# Patient Record
Sex: FEMALE | Race: WHITE | NOT HISPANIC OR LATINO
[De-identification: names, ages, dates, MRNs, and addresses within clinical notes are randomized per-mention and may not be internally consistent; named-entity substitution may affect disease eponyms.]

---

## 2019-12-06 PROBLEM — Z00.00 ENCOUNTER FOR PREVENTIVE HEALTH EXAMINATION: Status: ACTIVE | Noted: 2019-12-06

## 2019-12-09 ENCOUNTER — APPOINTMENT (OUTPATIENT)
Dept: CARDIOLOGY | Facility: CLINIC | Age: 64
End: 2019-12-09
Payer: COMMERCIAL

## 2019-12-09 ENCOUNTER — NON-APPOINTMENT (OUTPATIENT)
Age: 64
End: 2019-12-09

## 2019-12-09 VITALS
SYSTOLIC BLOOD PRESSURE: 110 MMHG | HEART RATE: 64 BPM | WEIGHT: 127 LBS | OXYGEN SATURATION: 98 % | DIASTOLIC BLOOD PRESSURE: 60 MMHG | BODY MASS INDEX: 22.5 KG/M2 | HEIGHT: 63 IN

## 2019-12-09 VITALS — SYSTOLIC BLOOD PRESSURE: 116 MMHG | DIASTOLIC BLOOD PRESSURE: 60 MMHG

## 2019-12-09 DIAGNOSIS — Z78.9 OTHER SPECIFIED HEALTH STATUS: ICD-10-CM

## 2019-12-09 DIAGNOSIS — Z82.49 FAMILY HISTORY OF ISCHEMIC HEART DISEASE AND OTHER DISEASES OF THE CIRCULATORY SYSTEM: ICD-10-CM

## 2019-12-09 PROCEDURE — 99204 OFFICE O/P NEW MOD 45 MIN: CPT

## 2019-12-09 PROCEDURE — 93000 ELECTROCARDIOGRAM COMPLETE: CPT

## 2019-12-09 NOTE — HISTORY OF PRESENT ILLNESS
[FreeTextEntry1] : The patient presented here at [facility] for cardiac evaluation. Patient is 64 and ALT female without any significant medical history. She does not take any medications. Patient is physically active. She exercises on a daily basis. She denies any chest pains or shortness of breath.

## 2019-12-09 NOTE — ASSESSMENT
[FreeTextEntry1] : That his ECG was reviewed. Normal sinus rhythm, normal EKG. Patient is asymptomatic. Patient is physically very active. Her blood work was reviewed. Her cholesterol is within normal limits. Continue, prevention. Continue exercise and diet. Cardiac followup yearly and as needed.

## 2019-12-09 NOTE — PHYSICAL EXAM
[Well Groomed] : well groomed [General Appearance - Well Developed] : well developed [Normal Appearance] : normal appearance [No Deformities] : no deformities [General Appearance - Well Nourished] : well nourished [General Appearance - In No Acute Distress] : no acute distress [Normal Conjunctiva] : the conjunctiva exhibited no abnormalities [Eyelids - No Xanthelasma] : the eyelids demonstrated no xanthelasmas [Normal Oral Mucosa] : normal oral mucosa [No Oral Pallor] : no oral pallor [No Oral Cyanosis] : no oral cyanosis [Normal Jugular Venous A Waves Present] : normal jugular venous A waves present [Normal Jugular Venous V Waves Present] : normal jugular venous V waves present [Heart Rate And Rhythm] : heart rate and rhythm were normal [No Jugular Venous Mendiola A Waves] : no jugular venous mendiola A waves [Heart Sounds] : normal S1 and S2 [Murmurs] : no murmurs present [Respiration, Rhythm And Depth] : normal respiratory rhythm and effort [Exaggerated Use Of Accessory Muscles For Inspiration] : no accessory muscle use [Auscultation Breath Sounds / Voice Sounds] : lungs were clear to auscultation bilaterally [Abdomen Soft] : soft [Abdomen Tenderness] : non-tender [] : no hepato-splenomegaly [Abdomen Mass (___ Cm)] : no abdominal mass palpated [Abnormal Walk] : normal gait [Gait - Sufficient For Exercise Testing] : the gait was sufficient for exercise testing

## 2021-11-17 ENCOUNTER — APPOINTMENT (OUTPATIENT)
Dept: OPHTHALMOLOGY | Facility: CLINIC | Age: 66
End: 2021-11-17
Payer: MEDICARE

## 2021-11-17 ENCOUNTER — NON-APPOINTMENT (OUTPATIENT)
Age: 66
End: 2021-11-17

## 2021-11-17 PROCEDURE — 99213 OFFICE O/P EST LOW 20 MIN: CPT

## 2021-11-17 PROCEDURE — 92020 GONIOSCOPY: CPT

## 2022-03-10 ENCOUNTER — APPOINTMENT (OUTPATIENT)
Dept: CARDIOLOGY | Facility: CLINIC | Age: 67
End: 2022-03-10
Payer: MEDICARE

## 2022-03-10 VITALS
HEIGHT: 63 IN | BODY MASS INDEX: 22.15 KG/M2 | DIASTOLIC BLOOD PRESSURE: 66 MMHG | OXYGEN SATURATION: 98 % | WEIGHT: 125 LBS | TEMPERATURE: 98.2 F | HEART RATE: 55 BPM | SYSTOLIC BLOOD PRESSURE: 104 MMHG

## 2022-03-10 PROCEDURE — 99214 OFFICE O/P EST MOD 30 MIN: CPT

## 2022-03-10 NOTE — HISTORY OF PRESENT ILLNESS
[FreeTextEntry1] : The patient presented here at [facility] for cardiac evaluation. Patient is 66 and ALT female without any significant medical history. She does not take any medications. Patient is physically active. She exercises on a daily basis. She denies any chest pains or shortness of breath.

## 2022-03-10 NOTE — ASSESSMENT
[FreeTextEntry1] : That his ECG was reviewed. Normal sinus rhythm, normal EKG. Patient is asymptomatic. Patient is physically very active. Her blood work was reviewed. Her cholesterol is within normal limits. Continue primary prevention. Continue exercise and diet.\par \par She reports mild dyspnea on exertion.  We will follow order echocardiogram for this year to reevaluate ejection fraction and rule out any evidence of pericardial effusion.  Follow-up 6 months after echocardiogram.  And follow-up as needed.

## 2022-03-10 NOTE — PHYSICAL EXAM
[General Appearance - Well Developed] : well developed [Normal Appearance] : normal appearance [Well Groomed] : well groomed [General Appearance - Well Nourished] : well nourished [General Appearance - In No Acute Distress] : no acute distress [No Deformities] : no deformities [Normal Conjunctiva] : the conjunctiva exhibited no abnormalities [Eyelids - No Xanthelasma] : the eyelids demonstrated no xanthelasmas [Normal Oral Mucosa] : normal oral mucosa [No Oral Pallor] : no oral pallor [No Oral Cyanosis] : no oral cyanosis [Normal Jugular Venous A Waves Present] : normal jugular venous A waves present [Normal Jugular Venous V Waves Present] : normal jugular venous V waves present [No Jugular Venous Mendiola A Waves] : no jugular venous mendiola A waves [Heart Rate And Rhythm] : heart rate and rhythm were normal [Heart Sounds] : normal S1 and S2 [Murmurs] : no murmurs present [Respiration, Rhythm And Depth] : normal respiratory rhythm and effort [Exaggerated Use Of Accessory Muscles For Inspiration] : no accessory muscle use [Auscultation Breath Sounds / Voice Sounds] : lungs were clear to auscultation bilaterally [Abdomen Soft] : soft [Abdomen Tenderness] : non-tender [] : no hepato-splenomegaly [Abdomen Mass (___ Cm)] : no abdominal mass palpated [Abnormal Walk] : normal gait [Gait - Sufficient For Exercise Testing] : the gait was sufficient for exercise testing [Normal] : no edema, no cyanosis, no clubbing, no varicosities

## 2022-05-25 ENCOUNTER — APPOINTMENT (OUTPATIENT)
Dept: OPHTHALMOLOGY | Facility: CLINIC | Age: 67
End: 2022-05-25
Payer: MEDICARE

## 2022-05-25 ENCOUNTER — NON-APPOINTMENT (OUTPATIENT)
Age: 67
End: 2022-05-25

## 2022-05-25 PROCEDURE — 92020 GONIOSCOPY: CPT

## 2022-05-25 PROCEDURE — 92014 COMPRE OPH EXAM EST PT 1/>: CPT

## 2022-09-13 ENCOUNTER — APPOINTMENT (OUTPATIENT)
Dept: CARDIOLOGY | Facility: CLINIC | Age: 67
End: 2022-09-13

## 2022-09-13 VITALS
SYSTOLIC BLOOD PRESSURE: 122 MMHG | WEIGHT: 124 LBS | BODY MASS INDEX: 21.97 KG/M2 | HEIGHT: 63 IN | TEMPERATURE: 97.1 F | DIASTOLIC BLOOD PRESSURE: 68 MMHG | OXYGEN SATURATION: 99 % | HEART RATE: 45 BPM

## 2022-09-13 PROCEDURE — 99214 OFFICE O/P EST MOD 30 MIN: CPT

## 2022-09-13 PROCEDURE — 93306 TTE W/DOPPLER COMPLETE: CPT

## 2022-09-13 NOTE — HISTORY OF PRESENT ILLNESS
[FreeTextEntry1] : The patient presented here at [facility] for cardiac evaluation. Patient is 67  female without any significant medical history. She does not take any medications. Patient is physically active. She exercises on a daily basis. She denies any chest pains or shortness of breath.

## 2022-09-13 NOTE — PHYSICAL EXAM
[Normal] : no edema, no cyanosis, no clubbing, no varicosities [General Appearance - Well Developed] : well developed [Normal Appearance] : normal appearance [Well Groomed] : well groomed [General Appearance - Well Nourished] : well nourished [No Deformities] : no deformities [General Appearance - In No Acute Distress] : no acute distress [Normal Conjunctiva] : the conjunctiva exhibited no abnormalities [Eyelids - No Xanthelasma] : the eyelids demonstrated no xanthelasmas [Normal Oral Mucosa] : normal oral mucosa [No Oral Pallor] : no oral pallor [No Oral Cyanosis] : no oral cyanosis [Normal Jugular Venous A Waves Present] : normal jugular venous A waves present [Normal Jugular Venous V Waves Present] : normal jugular venous V waves present [No Jugular Venous Mendiola A Waves] : no jugular venous mendiola A waves [Heart Rate And Rhythm] : heart rate and rhythm were normal [Heart Sounds] : normal S1 and S2 [Murmurs] : no murmurs present [Respiration, Rhythm And Depth] : normal respiratory rhythm and effort [Exaggerated Use Of Accessory Muscles For Inspiration] : no accessory muscle use [Auscultation Breath Sounds / Voice Sounds] : lungs were clear to auscultation bilaterally [Abdomen Soft] : soft [Abdomen Tenderness] : non-tender [] : no hepato-splenomegaly [Abdomen Mass (___ Cm)] : no abdominal mass palpated [Abnormal Walk] : normal gait [Gait - Sufficient For Exercise Testing] : the gait was sufficient for exercise testing

## 2022-09-13 NOTE — ASSESSMENT
[FreeTextEntry1] : That his ECG was reviewed. Normal sinus rhythm, normal EKG. Patient is asymptomatic. Patient is physically very active. Her blood work was reviewed. Her cholesterol is within normal limits. Continue primary prevention. Continue exercise and diet.\par \par Echocardiogram done today was reviewed.  Normal ejection fraction.  No significant valvulopathy.  Continue primary prevention.  Cardiac follow-up 6 months and as needed.

## 2022-11-30 ENCOUNTER — NON-APPOINTMENT (OUTPATIENT)
Age: 67
End: 2022-11-30

## 2022-11-30 ENCOUNTER — APPOINTMENT (OUTPATIENT)
Dept: OPHTHALMOLOGY | Facility: CLINIC | Age: 67
End: 2022-11-30

## 2022-11-30 PROCEDURE — 92020 GONIOSCOPY: CPT

## 2022-11-30 PROCEDURE — 99213 OFFICE O/P EST LOW 20 MIN: CPT

## 2022-11-30 PROCEDURE — 76514 ECHO EXAM OF EYE THICKNESS: CPT

## 2023-02-09 ENCOUNTER — NON-APPOINTMENT (OUTPATIENT)
Age: 68
End: 2023-02-09

## 2023-02-09 ENCOUNTER — APPOINTMENT (OUTPATIENT)
Dept: CARDIOLOGY | Facility: CLINIC | Age: 68
End: 2023-02-09
Payer: MEDICARE

## 2023-02-09 VITALS
OXYGEN SATURATION: 98 % | SYSTOLIC BLOOD PRESSURE: 124 MMHG | HEIGHT: 60 IN | HEART RATE: 49 BPM | TEMPERATURE: 97.7 F | DIASTOLIC BLOOD PRESSURE: 70 MMHG | WEIGHT: 131 LBS | BODY MASS INDEX: 25.72 KG/M2

## 2023-02-09 PROCEDURE — 93000 ELECTROCARDIOGRAM COMPLETE: CPT

## 2023-02-09 PROCEDURE — 99214 OFFICE O/P EST MOD 30 MIN: CPT

## 2023-02-09 NOTE — PHYSICAL EXAM
[Well Developed] : well developed [Well Nourished] : well nourished [No Acute Distress] : no acute distress [Normal Venous Pressure] : normal venous pressure [No Carotid Bruit] : no carotid bruit [Normal S1, S2] : normal S1, S2 [No Murmur] : no murmur [No Rub] : no rub [No Gallop] : no gallop [Clear Lung Fields] : clear lung fields [Good Air Entry] : good air entry [No Respiratory Distress] : no respiratory distress  [Soft] : abdomen soft [Non Tender] : non-tender [No Masses/organomegaly] : no masses/organomegaly [Normal Bowel Sounds] : normal bowel sounds [Normal Gait] : normal gait [Normal] : no edema, no cyanosis, no clubbing, no varicosities [No Edema] : no edema [No Cyanosis] : no cyanosis [No Clubbing] : no clubbing [No Varicosities] : no varicosities [No Rash] : no rash [No Skin Lesions] : no skin lesions [Moves all extremities] : moves all extremities [No Focal Deficits] : no focal deficits [Normal Speech] : normal speech [Alert and Oriented] : alert and oriented [Normal memory] : normal memory [General Appearance - Well Developed] : well developed [Normal Appearance] : normal appearance [Well Groomed] : well groomed [General Appearance - Well Nourished] : well nourished [No Deformities] : no deformities [General Appearance - In No Acute Distress] : no acute distress [Normal Conjunctiva] : the conjunctiva exhibited no abnormalities [Eyelids - No Xanthelasma] : the eyelids demonstrated no xanthelasmas [Normal Oral Mucosa] : normal oral mucosa [No Oral Pallor] : no oral pallor [No Oral Cyanosis] : no oral cyanosis [Normal Jugular Venous A Waves Present] : normal jugular venous A waves present [Normal Jugular Venous V Waves Present] : normal jugular venous V waves present [No Jugular Venous Mendiola A Waves] : no jugular venous mendiola A waves [Heart Rate And Rhythm] : heart rate and rhythm were normal [Heart Sounds] : normal S1 and S2 [Murmurs] : no murmurs present [Respiration, Rhythm And Depth] : normal respiratory rhythm and effort [Exaggerated Use Of Accessory Muscles For Inspiration] : no accessory muscle use [Auscultation Breath Sounds / Voice Sounds] : lungs were clear to auscultation bilaterally [Abdomen Soft] : soft [Abdomen Tenderness] : non-tender [] : no hepato-splenomegaly [Abdomen Mass (___ Cm)] : no abdominal mass palpated [Abnormal Walk] : normal gait [Gait - Sufficient For Exercise Testing] : the gait was sufficient for exercise testing

## 2023-02-09 NOTE — ASSESSMENT
[FreeTextEntry1] : That his ECG was reviewed.  Sinus bradycardia.  No history of syncope or near syncope.  Recent complaints of lightheadedness.  Increase hydration discussed with the patient.  Last echocardiogram was done in September 2022 results reviewed.  I recommend Zio patch for 1 week to rule out any significant bradycardia possibly causing the symptoms.  I do recommend carotid ultrasound to rule out any carotid disease.  Follow-up after testing.

## 2023-03-07 ENCOUNTER — APPOINTMENT (OUTPATIENT)
Dept: CARDIOLOGY | Facility: CLINIC | Age: 68
End: 2023-03-07
Payer: MEDICARE

## 2023-03-07 PROCEDURE — 93244 EXT ECG>48HR<7D REV&INTERPJ: CPT

## 2023-03-14 ENCOUNTER — APPOINTMENT (OUTPATIENT)
Dept: CARDIOLOGY | Facility: CLINIC | Age: 68
End: 2023-03-14
Payer: MEDICARE

## 2023-03-14 ENCOUNTER — APPOINTMENT (OUTPATIENT)
Dept: CARDIOLOGY | Facility: CLINIC | Age: 68
End: 2023-03-14

## 2023-03-14 VITALS
HEART RATE: 64 BPM | HEIGHT: 60 IN | DIASTOLIC BLOOD PRESSURE: 60 MMHG | BODY MASS INDEX: 24.54 KG/M2 | WEIGHT: 125 LBS | SYSTOLIC BLOOD PRESSURE: 102 MMHG | OXYGEN SATURATION: 98 % | TEMPERATURE: 97.7 F

## 2023-03-14 DIAGNOSIS — G47.30 SLEEP APNEA, UNSPECIFIED: ICD-10-CM

## 2023-03-14 DIAGNOSIS — R42 DIZZINESS AND GIDDINESS: ICD-10-CM

## 2023-03-14 DIAGNOSIS — R94.31 ABNORMAL ELECTROCARDIOGRAM [ECG] [EKG]: ICD-10-CM

## 2023-03-14 DIAGNOSIS — R06.02 SHORTNESS OF BREATH: ICD-10-CM

## 2023-03-14 PROCEDURE — 99214 OFFICE O/P EST MOD 30 MIN: CPT

## 2023-03-14 PROCEDURE — 93880 EXTRACRANIAL BILAT STUDY: CPT

## 2023-03-14 NOTE — PHYSICAL EXAM
[Well Developed] : well developed [Well Nourished] : well nourished [No Acute Distress] : no acute distress [Normal Venous Pressure] : normal venous pressure [No Carotid Bruit] : no carotid bruit [Normal S1, S2] : normal S1, S2 [No Murmur] : no murmur [No Rub] : no rub [No Gallop] : no gallop [Clear Lung Fields] : clear lung fields [Good Air Entry] : good air entry [No Respiratory Distress] : no respiratory distress  [Soft] : abdomen soft [Non Tender] : non-tender [No Masses/organomegaly] : no masses/organomegaly [Normal Bowel Sounds] : normal bowel sounds [Normal Gait] : normal gait [Normal] : no edema, no cyanosis, no clubbing, no varicosities [No Edema] : no edema [No Cyanosis] : no cyanosis [No Clubbing] : no clubbing [No Varicosities] : no varicosities [No Rash] : no rash [No Skin Lesions] : no skin lesions [Moves all extremities] : moves all extremities [No Focal Deficits] : no focal deficits [Normal Speech] : normal speech [Alert and Oriented] : alert and oriented [Normal memory] : normal memory [General Appearance - Well Developed] : well developed [Normal Appearance] : normal appearance [Well Groomed] : well groomed [General Appearance - Well Nourished] : well nourished [No Deformities] : no deformities [General Appearance - In No Acute Distress] : no acute distress [Normal Conjunctiva] : the conjunctiva exhibited no abnormalities [Eyelids - No Xanthelasma] : the eyelids demonstrated no xanthelasmas [Normal Oral Mucosa] : normal oral mucosa [No Oral Pallor] : no oral pallor [No Oral Cyanosis] : no oral cyanosis [Normal Jugular Venous A Waves Present] : normal jugular venous A waves present [Normal Jugular Venous V Waves Present] : normal jugular venous V waves present [No Jugular Venous Mendiola A Waves] : no jugular venous mendiola A waves [Heart Rate And Rhythm] : heart rate and rhythm were normal [Heart Sounds] : normal S1 and S2 [Murmurs] : no murmurs present [Respiration, Rhythm And Depth] : normal respiratory rhythm and effort [Auscultation Breath Sounds / Voice Sounds] : lungs were clear to auscultation bilaterally [Exaggerated Use Of Accessory Muscles For Inspiration] : no accessory muscle use [Abdomen Soft] : soft [Abdomen Tenderness] : non-tender [] : no hepato-splenomegaly [Abdomen Mass (___ Cm)] : no abdominal mass palpated [Abnormal Walk] : normal gait [Gait - Sufficient For Exercise Testing] : the gait was sufficient for exercise testing

## 2023-03-14 NOTE — ASSESSMENT
[FreeTextEntry1] : ECG was reviewed.  Sinus bradycardia.  No history of syncope or near syncope.  Recent complaints of lightheadedness.  Increase hydration discussed with the patient.  Last echocardiogram was done in September 2022 results reviewed.  Zio patch results reviewed.  Carotid ultrasound reviewed.  No significant changes.  Cardiac follow-up yearly and as needed.

## 2023-03-14 NOTE — HISTORY OF PRESENT ILLNESS
[FreeTextEntry1] : The patient presented here for cardiac follow-up visit.  Patient is 67  female without any significant medical history. She does not take any medications. Patient is physically active. She exercises on a daily basis. She denies any chest pains or shortness of breath.

## 2023-05-31 ENCOUNTER — NON-APPOINTMENT (OUTPATIENT)
Age: 68
End: 2023-05-31

## 2023-05-31 ENCOUNTER — APPOINTMENT (OUTPATIENT)
Dept: OPHTHALMOLOGY | Facility: CLINIC | Age: 68
End: 2023-05-31
Payer: MEDICARE

## 2023-05-31 PROCEDURE — 92020 GONIOSCOPY: CPT

## 2023-05-31 PROCEDURE — 99214 OFFICE O/P EST MOD 30 MIN: CPT

## 2023-11-29 ENCOUNTER — APPOINTMENT (OUTPATIENT)
Dept: OPHTHALMOLOGY | Facility: CLINIC | Age: 68
End: 2023-11-29
Payer: MEDICARE

## 2023-11-29 ENCOUNTER — NON-APPOINTMENT (OUTPATIENT)
Age: 68
End: 2023-11-29

## 2023-11-29 PROCEDURE — 92020 GONIOSCOPY: CPT

## 2023-11-29 PROCEDURE — 99213 OFFICE O/P EST LOW 20 MIN: CPT

## 2024-06-06 ENCOUNTER — NON-APPOINTMENT (OUTPATIENT)
Age: 69
End: 2024-06-06

## 2024-06-06 ENCOUNTER — APPOINTMENT (OUTPATIENT)
Dept: OPHTHALMOLOGY | Facility: CLINIC | Age: 69
End: 2024-06-06
Payer: MEDICARE

## 2024-06-06 PROCEDURE — 92014 COMPRE OPH EXAM EST PT 1/>: CPT

## 2024-06-06 PROCEDURE — 92020 GONIOSCOPY: CPT

## 2024-08-27 ENCOUNTER — NON-APPOINTMENT (OUTPATIENT)
Age: 69
End: 2024-08-27

## 2024-08-27 ENCOUNTER — APPOINTMENT (OUTPATIENT)
Dept: CARDIOLOGY | Facility: CLINIC | Age: 69
End: 2024-08-27
Payer: MEDICARE

## 2024-08-27 VITALS
DIASTOLIC BLOOD PRESSURE: 68 MMHG | HEART RATE: 60 BPM | HEIGHT: 60 IN | BODY MASS INDEX: 25.13 KG/M2 | OXYGEN SATURATION: 100 % | SYSTOLIC BLOOD PRESSURE: 124 MMHG | WEIGHT: 128 LBS

## 2024-08-27 DIAGNOSIS — G47.30 SLEEP APNEA, UNSPECIFIED: ICD-10-CM

## 2024-08-27 DIAGNOSIS — R94.31 ABNORMAL ELECTROCARDIOGRAM [ECG] [EKG]: ICD-10-CM

## 2024-08-27 DIAGNOSIS — Z82.49 FAMILY HISTORY OF ISCHEMIC HEART DISEASE AND OTHER DISEASES OF THE CIRCULATORY SYSTEM: ICD-10-CM

## 2024-08-27 DIAGNOSIS — R42 DIZZINESS AND GIDDINESS: ICD-10-CM

## 2024-08-27 PROCEDURE — 93242 EXT ECG>48HR<7D RECORDING: CPT

## 2024-08-27 PROCEDURE — 99214 OFFICE O/P EST MOD 30 MIN: CPT

## 2024-08-27 PROCEDURE — 93000 ELECTROCARDIOGRAM COMPLETE: CPT | Mod: 59

## 2024-08-27 RX ORDER — ESTRADIOL 0.1 MG/G
0.1 CREAM VAGINAL
Refills: 0 | Status: ACTIVE | COMMUNITY

## 2024-08-27 NOTE — HISTORY OF PRESENT ILLNESS
C/o's urgency and frequency. Denies any odor.  Treated for a UTI on 12/8 with Macrobid.   Order for u/a entered.  
Pt would like the nurse to call her stating that she has another uti and would like labs,please call pt at [phone]  
U/a results reviewed with Dr Hoyt. Used Macrobid in Dec and it was \"intermediate\" on C&S.   Will give Cipro today and await culture.   Offered urology referral also due to recurrent uti's. Pt will consider.  
Urine cult and sensitivity is back. Cipro is sensitive.  Pt was started on Cipro 500 mg BID x 5 days on 1/9/17.   Pt aware and states sxs are improving.  Will consider the urology referral and rtc as needed.    
[FreeTextEntry1] : JORGE MCCLURE is a 69 year old female with a past medical history of abn EKG and NICKI on CPAP. Additional hx of prolapsed uterus, UTIS, narrow angle glaucoma, and vasovagal syncope in the setting of prior lab draws, etc. Note PVCs on Zio in past.  Last seen 3/14/23. In the interim there have been no hospitalizations or procedures. Reports palpitations at times. Reports lightheadedness at times. Denies CP, SOB, syncope, claudication, and edema. No smoking hx. Walks daily and also does some strength training without any exertional complaints.  ASCVD 7.5%.  Of note there is a family hx of abd aortic aneurysm in maternal grandmother.  Testing:  EKG 8/27/24: SB at 53 bpm, WI interval 148 ms, QTc 383 ms  Labs 5/2024: WBC 5.31, Hgb 12.9, HCT 39.4, plt 288, Na 138, K 4.1, Cr 0.75, Ca 9.5, AST 20, ALT 21, Trigs 45, HDL 58, , Chol 170, A1C 5.6, TSH 1.23.  Zio 2023: SR average HR 63 bpm. Rare PACs, Frequent PVCs 6%. +Sxs with SR/PVCs, bigem, trigem. Carotid u/s 3/2023: Antegrade flow BL. Mild plaque BL.  Labs 2022: TSH 2.23, Chol 183, HDL 74, , Trigs 47, Na 140, K 4.4, Cr 0.79, Ca 9.9, AST 23, ALT 17, A1C 5.7.   Echo 2022: EF 60-65%. MIld MR. Normal wall motion. Mild TR> MInimal WI.

## 2024-08-27 NOTE — DISCUSSION/SUMMARY
Pt called and LM advising CXR results are stable and will await results for PFTs.  Advised to f/u with PCP or Pulmonologist. She is to call if she has questions.    [FreeTextEntry1] : JORGE MCCLURE is a 69 year old F who presents today Aug 27, 2024 with the above history and the following active issues:  Lightheadedness and palpitations. Note prior PVC burden. Fam hx of aortic aneurysm in maternal grandmother. Plan: Obtain 2d echocardiogram to evaluate resting heart structure, valvular function, and LVEF.  Obtain carotid ultrasound to evaluate for evidence of significant carotid atherosclerosis or obstruction. Obtain abdominal ultrasound to evaluate for aortic aneurysm or significant aortic atherosclerosis.  Obtain 3 day Zio monitor to determine presence of arrhythmias or significant pauses.  Obtain exercise stress test to assess BP response to exercise, exercise induced arrhythmias, or evidence of ischemia.  ASCVD 7.5%. Recommend CT Calcium score.  NICKI on CPAP.   Ongoing f/u with PCP.  F/U after testing to review results. Discussed red flag symptoms, which would warrant sooner or emergent medical evaluation. Any questions and concerns were addressed and resolved.  Sincerely, Chloe Mosley [facility] Patient's history, testing, and plan was reviewed with supervising physician, Dr. Patrick Valentino

## 2024-09-09 PROCEDURE — 93244 EXT ECG>48HR<7D REV&INTERPJ: CPT

## 2024-09-13 ENCOUNTER — APPOINTMENT (OUTPATIENT)
Dept: CARDIOLOGY | Facility: CLINIC | Age: 69
End: 2024-09-13
Payer: MEDICARE

## 2024-09-13 PROCEDURE — 93978 VASCULAR STUDY: CPT

## 2024-09-13 PROCEDURE — 93306 TTE W/DOPPLER COMPLETE: CPT

## 2024-09-13 PROCEDURE — 93880 EXTRACRANIAL BILAT STUDY: CPT

## 2024-09-20 ENCOUNTER — APPOINTMENT (OUTPATIENT)
Dept: CARDIOLOGY | Facility: CLINIC | Age: 69
End: 2024-09-20
Payer: MEDICARE

## 2024-09-20 DIAGNOSIS — R94.31 ABNORMAL ELECTROCARDIOGRAM [ECG] [EKG]: ICD-10-CM

## 2024-09-20 DIAGNOSIS — R42 DIZZINESS AND GIDDINESS: ICD-10-CM

## 2024-09-20 DIAGNOSIS — R06.02 SHORTNESS OF BREATH: ICD-10-CM

## 2024-09-20 PROCEDURE — 93015 CV STRESS TEST SUPVJ I&R: CPT

## 2024-10-02 ENCOUNTER — APPOINTMENT (OUTPATIENT)
Dept: CARDIOLOGY | Facility: CLINIC | Age: 69
End: 2024-10-02
Payer: MEDICARE

## 2024-10-02 VITALS
SYSTOLIC BLOOD PRESSURE: 118 MMHG | DIASTOLIC BLOOD PRESSURE: 66 MMHG | BODY MASS INDEX: 24.41 KG/M2 | WEIGHT: 125 LBS | HEART RATE: 52 BPM | OXYGEN SATURATION: 100 %

## 2024-10-02 DIAGNOSIS — R06.02 SHORTNESS OF BREATH: ICD-10-CM

## 2024-10-02 DIAGNOSIS — G47.30 SLEEP APNEA, UNSPECIFIED: ICD-10-CM

## 2024-10-02 DIAGNOSIS — R42 DIZZINESS AND GIDDINESS: ICD-10-CM

## 2024-10-02 DIAGNOSIS — R94.31 ABNORMAL ELECTROCARDIOGRAM [ECG] [EKG]: ICD-10-CM

## 2024-10-02 PROCEDURE — 99204 OFFICE O/P NEW MOD 45 MIN: CPT

## 2024-10-09 NOTE — HISTORY OF PRESENT ILLNESS
[FreeTextEntry1] : JORGE MCCLURE is a 69 year old female with a past medical history of abn EKG and NICKI on CPAP. Additional hx of prolapsed uterus, UTIS, narrow angle glaucoma, and vasovagal syncope in the setting of prior lab draws, etc. Note PVCs on Zio in past.   Of note there is a family hx of abd aortic aneurysm in maternal grandmother.  Last seen 8/2024. Testing was ordered and she presents today to review. Echo, carotid, abd, Zio, ETT, and CT calcium score ordered. See details below. There was an episode of lightheadedness and palpitations this morning that resolved after eating and drinking. She denies chest pain, pressure, palpitations, unusual shortness of breath, orthopnea, LE edema, near syncope or syncope. No smoking hx. Walks daily and also does some strength training without any exertional complaints.  ASCVD 7.5%.  Testing:  ETT 9/20/24: EKGs negative for ischemia. METs 10.5.   Echo 9/2024: CONCLUSIONS: 1. Left ventricular systolic function is normal with an ejection fraction of 65 % by Zacarias's method of  disks with an ejection fraction visually estimated at 60 to 65 %. 2. Normal left ventricular diastolic function. 3. There is mild calcification of the aortic valve leaflets. 4. Thickened mitral valve leaflets. Mild mitral regurgitation. 5. Mild tricuspid regurgitation. 6. Estimated pulmonary artery systolic pressure is 20 mmHg. 7. Normal pericardium. 8. No pericardial effusion seen. 9. Compared to prior on 09/2023- No significant changes  Carotid u/s 9/2024: Mild nonobs carotid dz noted BL. Abd u/s 9/2024: No AAA. Mild plaque.  CT calcium score 9/9/24: Total score 128.   Zio 8/2024: SR - symptomatic with rates 60's. <1% ectopy.  EKG 8/27/24: SB at 53 bpm, OK interval 148 ms, QTc 383 ms  Labs 5/2024: WBC 5.31, Hgb 12.9, HCT 39.4, plt 288, Na 138, K 4.1, Cr 0.75, Ca 9.5, AST 20, ALT 21, Trigs 45, HDL 58, , Chol 170, A1C 5.6, TSH 1.23.  Zio 2023: SR average HR 63 bpm. Rare PACs, Frequent PVCs 6%. +Sxs with SR/PVCs, bigem, trigem. Carotid u/s 3/2023: Antegrade flow BL. Mild plaque BL.  Labs 2022: TSH 2.23, Chol 183, HDL 74, , Trigs 47, Na 140, K 4.4, Cr 0.79, Ca 9.9, AST 23, ALT 17, A1C 5.7.   Echo 2022: EF 60-65%. MIld MR. Normal wall motion. Mild TR> MInimal OK.

## 2024-10-09 NOTE — DISCUSSION/SUMMARY
[FreeTextEntry1] : JORGE MCCLURE is a 69 year old F who presents today Oct 02, 2024 with the above history and the following active issues:  Testing done as noted above without any significant findings. CT calcium score 128. Recommend starting a baby aspirin daily and a statin. Patient declines statin at this time but will have f/u b/w done and rethink about it in the future. She will utilize dietary measures at present.  ASCVD 7.5%.  NICKI on CPAP.   Ongoing f/u with PCP.  F/U in 6 months with lab work prior. Discussed red flag symptoms, which would warrant sooner or emergent medical evaluation. Any questions and concerns were addressed and resolved.  Sincerely, Chloe Mosley [facility]

## 2024-10-09 NOTE — HISTORY OF PRESENT ILLNESS
[FreeTextEntry1] : JORGE MCCLURE is a 69 year old female with a past medical history of abn EKG and NICKI on CPAP. Additional hx of prolapsed uterus, UTIS, narrow angle glaucoma, and vasovagal syncope in the setting of prior lab draws, etc. Note PVCs on Zio in past.   Of note there is a family hx of abd aortic aneurysm in maternal grandmother.  Last seen 8/2024. Testing was ordered and she presents today to review. Echo, carotid, abd, Zio, ETT, and CT calcium score ordered. See details below. There was an episode of lightheadedness and palpitations this morning that resolved after eating and drinking. She denies chest pain, pressure, palpitations, unusual shortness of breath, orthopnea, LE edema, near syncope or syncope. No smoking hx. Walks daily and also does some strength training without any exertional complaints.  ASCVD 7.5%.  Testing:  ETT 9/20/24: EKGs negative for ischemia. METs 10.5.   Echo 9/2024: CONCLUSIONS: 1. Left ventricular systolic function is normal with an ejection fraction of 65 % by Zacarias's method of  disks with an ejection fraction visually estimated at 60 to 65 %. 2. Normal left ventricular diastolic function. 3. There is mild calcification of the aortic valve leaflets. 4. Thickened mitral valve leaflets. Mild mitral regurgitation. 5. Mild tricuspid regurgitation. 6. Estimated pulmonary artery systolic pressure is 20 mmHg. 7. Normal pericardium. 8. No pericardial effusion seen. 9. Compared to prior on 09/2023- No significant changes  Carotid u/s 9/2024: Mild nonobs carotid dz noted BL. Abd u/s 9/2024: No AAA. Mild plaque.  CT calcium score 9/9/24: Total score 128.   Zio 8/2024: SR - symptomatic with rates 60's. <1% ectopy.  EKG 8/27/24: SB at 53 bpm, ID interval 148 ms, QTc 383 ms  Labs 5/2024: WBC 5.31, Hgb 12.9, HCT 39.4, plt 288, Na 138, K 4.1, Cr 0.75, Ca 9.5, AST 20, ALT 21, Trigs 45, HDL 58, , Chol 170, A1C 5.6, TSH 1.23.  Zio 2023: SR average HR 63 bpm. Rare PACs, Frequent PVCs 6%. +Sxs with SR/PVCs, bigem, trigem. Carotid u/s 3/2023: Antegrade flow BL. Mild plaque BL.  Labs 2022: TSH 2.23, Chol 183, HDL 74, , Trigs 47, Na 140, K 4.4, Cr 0.79, Ca 9.9, AST 23, ALT 17, A1C 5.7.   Echo 2022: EF 60-65%. MIld MR. Normal wall motion. Mild TR> MInimal ID.

## 2024-12-19 ENCOUNTER — APPOINTMENT (OUTPATIENT)
Dept: OPHTHALMOLOGY | Facility: CLINIC | Age: 69
End: 2024-12-19
Payer: MEDICARE

## 2024-12-19 ENCOUNTER — NON-APPOINTMENT (OUTPATIENT)
Age: 69
End: 2024-12-19

## 2024-12-19 PROCEDURE — 92020 GONIOSCOPY: CPT

## 2024-12-19 PROCEDURE — 99213 OFFICE O/P EST LOW 20 MIN: CPT

## 2025-04-02 ENCOUNTER — APPOINTMENT (OUTPATIENT)
Dept: CARDIOLOGY | Facility: CLINIC | Age: 70
End: 2025-04-02
Payer: MEDICARE

## 2025-04-02 ENCOUNTER — NON-APPOINTMENT (OUTPATIENT)
Age: 70
End: 2025-04-02

## 2025-04-02 VITALS
WEIGHT: 125 LBS | BODY MASS INDEX: 24.41 KG/M2 | SYSTOLIC BLOOD PRESSURE: 128 MMHG | HEART RATE: 56 BPM | DIASTOLIC BLOOD PRESSURE: 62 MMHG | OXYGEN SATURATION: 97 %

## 2025-04-02 DIAGNOSIS — R94.31 ABNORMAL ELECTROCARDIOGRAM [ECG] [EKG]: ICD-10-CM

## 2025-04-02 DIAGNOSIS — R42 DIZZINESS AND GIDDINESS: ICD-10-CM

## 2025-04-02 DIAGNOSIS — G47.30 SLEEP APNEA, UNSPECIFIED: ICD-10-CM

## 2025-04-02 DIAGNOSIS — R06.02 SHORTNESS OF BREATH: ICD-10-CM

## 2025-04-02 PROCEDURE — 93242 EXT ECG>48HR<7D RECORDING: CPT

## 2025-04-02 PROCEDURE — 99214 OFFICE O/P EST MOD 30 MIN: CPT

## 2025-04-02 PROCEDURE — 93000 ELECTROCARDIOGRAM COMPLETE: CPT

## 2025-04-15 ENCOUNTER — APPOINTMENT (OUTPATIENT)
Dept: CARDIOLOGY | Facility: CLINIC | Age: 70
End: 2025-04-15
Payer: MEDICARE

## 2025-04-15 PROCEDURE — 93306 TTE W/DOPPLER COMPLETE: CPT

## 2025-05-07 ENCOUNTER — APPOINTMENT (OUTPATIENT)
Dept: CARDIOLOGY | Facility: CLINIC | Age: 70
End: 2025-05-07
Payer: MEDICARE

## 2025-05-07 VITALS
OXYGEN SATURATION: 98 % | HEART RATE: 54 BPM | HEIGHT: 60 IN | DIASTOLIC BLOOD PRESSURE: 68 MMHG | SYSTOLIC BLOOD PRESSURE: 114 MMHG | WEIGHT: 125 LBS | BODY MASS INDEX: 24.54 KG/M2

## 2025-05-07 DIAGNOSIS — R94.31 ABNORMAL ELECTROCARDIOGRAM [ECG] [EKG]: ICD-10-CM

## 2025-05-07 DIAGNOSIS — R42 DIZZINESS AND GIDDINESS: ICD-10-CM

## 2025-05-07 DIAGNOSIS — G47.30 SLEEP APNEA, UNSPECIFIED: ICD-10-CM

## 2025-05-07 PROCEDURE — 99213 OFFICE O/P EST LOW 20 MIN: CPT

## 2025-06-25 ENCOUNTER — NON-APPOINTMENT (OUTPATIENT)
Age: 70
End: 2025-06-25

## 2025-06-25 ENCOUNTER — APPOINTMENT (OUTPATIENT)
Dept: OPHTHALMOLOGY | Facility: CLINIC | Age: 70
End: 2025-06-25
Payer: MEDICARE

## 2025-06-25 PROCEDURE — 92014 COMPRE OPH EXAM EST PT 1/>: CPT

## 2025-06-25 PROCEDURE — 92020 GONIOSCOPY: CPT

## 2025-06-30 PROCEDURE — 93244 EXT ECG>48HR<7D REV&INTERPJ: CPT
